# Patient Record
(demographics unavailable — no encounter records)

---

## 2024-05-13 RX ORDER — PROGESTERONE 200 MG/1
400 CAPSULE ORAL 2 TIMES DAILY
Qty: 12 CAPSULE | Refills: 0 | Status: SHIPPED | OUTPATIENT
Start: 2024-05-13 | End: 2024-05-16

## 2024-05-13 RX ORDER — PROGESTERONE 200 MG/1
400 CAPSULE ORAL 2 TIMES DAILY
Qty: 12 CAPSULE | Refills: 0 | Status: SHIPPED | OUTPATIENT
Start: 2024-05-13 | End: 2024-05-13 | Stop reason: SDUPTHER

## 2024-05-13 NOTE — PROGRESS NOTES
Pt is a  9 wks gest age by fan. She ingested mifepristone on May 6, then misoprostol on May 9th, then another dose of mifepristone on May 11th.  She had a viable IUP by fan at [facility] today and expressed desire to continue with pregnancy and abandoned her decision of elective . She then called the APR hotline and she was connected to me as an APR provider.       Pt denies any bleeding/cramping or other medical issues. NKDA. She has had/ has not had recent STI testing. . She confirms/denies barriers to obtaining progesterone or any personal safety issues.     I discussed risks and benefits of off-label use of progesterone to potentially reverse the effects of mifepristone, which is a progesterone antagonist. There are no known long-term adverse effects to the fetus of either mifepristone or progesterone. Progesterone is a natural hormone produced by the body in pregnancy, and is used in threatened miscarriages. There is a chance of unsuccessful reversal, but it has a success rate around 65-68% if taken early. She would need close follow-up for serial HCG levels (at least 3 every 48-72 hours apart) and weekly ultrasounds until viable pregnancy is confirmed. We also discussed options if there is not a viable pregnancy despite progesterone, including watchful waiting after stopping progesterone, misoprostol to expulse the products of conception, or surgical management with a D&C. The later two options may lower risk of complications, such as hemorrhage.      Patient elected to proceed with progesterone. I called in a prescription to the 24 hr pharmacy or pharmacy of her choice of Progesterone micronized oral capsule 400 mg BID for 3 subsequent days.  (After we meet in the office, will evaluate and continue progesterone therapy: 400 mg daily for 14 days.     She will need a clinic appt with ultrasound in 72 hours at [facility].        Staff: please contact [facility] and offer appt on Tuesday with me for

## 2024-05-14 NOTE — PROGRESS NOTES
Tried to call the patient at:  Mobile: [phone]- number did not work  Home: [phone]- no answer or machine to leave message.

## 2024-05-15 NOTE — PROGRESS NOTES
Tried to call the patient at home number that worked yesterday and was told that the number had been disconnected or changed.